# Patient Record
Sex: FEMALE | Race: WHITE | URBAN - METROPOLITAN AREA
[De-identification: names, ages, dates, MRNs, and addresses within clinical notes are randomized per-mention and may not be internally consistent; named-entity substitution may affect disease eponyms.]

---

## 2022-10-10 ENCOUNTER — TELEPHONE (OUTPATIENT)
Dept: GASTROENTEROLOGY | Facility: CLINIC | Age: 72
End: 2022-10-10

## 2022-10-10 NOTE — TELEPHONE ENCOUNTER
LMOM for patient to phone back and schedule an office appt for a colonoscopy consultation with Dr Garcia Course

## 2022-11-21 ENCOUNTER — TELEPHONE (OUTPATIENT)
Dept: OBGYN CLINIC | Facility: OTHER | Age: 72
End: 2022-11-21

## 2022-11-21 NOTE — TELEPHONE ENCOUNTER
Offered appointment patient declined  Where Does it Hurt? Both wrists  Are you considering joint replacement? No   Are you seeking a second opinion? Yes  If yes, who is your doctor?  Tom Villanueva

## 2023-07-16 ENCOUNTER — TELEPHONE (OUTPATIENT)
Dept: OTHER | Facility: OTHER | Age: 73
End: 2023-07-16

## 2023-10-06 ENCOUNTER — TELEPHONE (OUTPATIENT)
Age: 73
End: 2023-10-06

## 2023-10-06 NOTE — TELEPHONE ENCOUNTER
Caller: Patient    Doctor/Office: Gastro     Call regarding :  Missed call     Call was transferred to: [facility]

## 2023-10-06 NOTE — TELEPHONE ENCOUNTER
Lm for ptn letting her know that Noni Sacks feel the ptn should schedule directly with one of our hepatologist. If ptn came to the office Ebonyjarad Rojas would just have to refer to to hepatology and that would be a waste of a visit for the ptn. If ptn calls back she needs to cancel her gastro appt and schedule with hepatology. 19-Jul-2022 13:26 spouse

## 2023-10-16 RX ORDER — MELOXICAM 15 MG/1
15 TABLET ORAL DAILY
COMMUNITY
Start: 2023-08-29

## 2023-10-16 RX ORDER — ASCORBIC ACID 500 MG
500 TABLET ORAL DAILY
COMMUNITY

## 2023-10-16 RX ORDER — PANTOPRAZOLE SODIUM 40 MG/1
40 TABLET, DELAYED RELEASE ORAL DAILY
COMMUNITY

## 2023-10-16 RX ORDER — METHYLPREDNISOLONE 4 MG/1
TABLET ORAL
COMMUNITY
Start: 2023-07-26

## 2023-10-16 RX ORDER — SPIRONOLACTONE 50 MG/1
25 TABLET, FILM COATED ORAL DAILY
COMMUNITY
Start: 2023-06-07 | End: 2024-06-06

## 2023-10-16 RX ORDER — FERROUS SULFATE 325(65) MG
TABLET ORAL
COMMUNITY

## 2023-10-16 RX ORDER — ROSUVASTATIN CALCIUM 10 MG/1
10 TABLET, COATED ORAL DAILY
COMMUNITY
Start: 2023-09-28

## 2023-10-16 RX ORDER — METOPROLOL TARTRATE 50 MG/1
50 TABLET, FILM COATED ORAL DAILY
COMMUNITY
Start: 2023-05-01

## 2023-10-16 RX ORDER — DIPHENHYDRAMINE HYDROCHLORIDE 25 MG/1
5 TABLET ORAL DAILY
COMMUNITY

## 2023-10-16 RX ORDER — LATANOPROST 50 UG/ML
SOLUTION/ DROPS OPHTHALMIC
COMMUNITY
Start: 2023-08-23

## 2023-10-16 RX ORDER — GABAPENTIN 300 MG/1
2 CAPSULE ORAL 2 TIMES DAILY
COMMUNITY
Start: 2023-09-13

## 2023-10-16 RX ORDER — AMLODIPINE BESYLATE 5 MG/1
5 TABLET ORAL DAILY
COMMUNITY
Start: 1983-05-05

## 2023-10-16 RX ORDER — BACLOFEN 10 MG/1
10 TABLET ORAL 3 TIMES DAILY
COMMUNITY

## 2023-10-16 RX ORDER — TRAMADOL HYDROCHLORIDE 50 MG/1
TABLET ORAL
COMMUNITY

## 2023-10-16 RX ORDER — ZOLPIDEM TARTRATE 5 MG/1
5 TABLET ORAL DAILY PRN
COMMUNITY
Start: 2023-10-10 | End: 2023-11-09

## 2023-10-17 ENCOUNTER — OFFICE VISIT (OUTPATIENT)
Dept: GASTROENTEROLOGY | Facility: CLINIC | Age: 73
End: 2023-10-17
Payer: COMMERCIAL

## 2023-10-17 VITALS
SYSTOLIC BLOOD PRESSURE: 124 MMHG | DIASTOLIC BLOOD PRESSURE: 84 MMHG | HEIGHT: 64 IN | HEART RATE: 65 BPM | WEIGHT: 216 LBS | BODY MASS INDEX: 36.88 KG/M2

## 2023-10-17 DIAGNOSIS — B18.1 CHRONIC HEPATITIS B (HCC): Primary | ICD-10-CM

## 2023-10-17 DIAGNOSIS — R93.2 ABNORMAL FINDING ON IMAGING OF LIVER: ICD-10-CM

## 2023-10-17 PROCEDURE — 99203 OFFICE O/P NEW LOW 30 MIN: CPT | Performed by: FAMILY MEDICINE

## 2023-10-17 NOTE — PROGRESS NOTES
[facility] - Outpatient Consultation  Moultrie Sandy 71 y.o. female MRN: [id number]  Encounter: [id number]          ASSESSMENT AND PLAN:      1. Chronic hepatitis B ([address])  2. Abnormal finding on imaging of liver  Patient with chronic HBV, presumably contracted through vertical transmission, historically noted to have a low viral load and normal transaminases. She does report an HBV "flare" characterized as acute jaundice in her 45s but has never been treated with medication for HBV. Prior abdominal imaging notable for coarsened hepatic echotexture, but otherwise without clinical or serologic evidence of chronic liver disease. Discussed the basic pathophysiology of HBV and general treatment recommendations for chronic HBV as per AASLD guidelines including treatment for immune-active individuals with ALT >2x ULN and viral load >2000 (eAg-) or >20,000 (eAg+) in the absence of advanced fibrosis. Ordered complete HBV serologies. Also ordered serologies to r/o a coinfection with HIV and/or HCV and assess patient's immunity status to hep A. Patient gave verbal consent for HIV testing. If patient meets criteria for treatment, discussed treatment with Viread vs Vemlidy (TAF) and their side-effect profile. She is aware this medication may be required indefinitely and the importance of medication compliance. If patient does not meet criteria for treatment, recommend checking serologies including a CMP and HBV viral load at least q6 months. Given she has reportedly had chronic HBV for approx 50 years, would also recommend a RUQ US and AFP level q6 months for hepatoma screening - Abdominal U/S and AFP levels ordered today. There is some concern for advanced hepatic fibrosis given coarsened hepatic echotexture noted on prior imaging.  Fortunately, she has no serologic or radiographic evidence of sg lHTN and no clinical evidence of chronic liver disease on physical exam. She will complete an U/S elastography for further evaluation.     - CBC and differential; Future  - Comprehensive metabolic panel; Future  - Protime-INR; Future  - AFP tumor marker; Future  - Chronic Hepatitis Panel; Future  - Hepatitis A antibody, total; Future  - Hepatitis B surface antibody; Future  - Hepatitis B E Antibody [facility]; Future  - Hepatitis B E Antigen [facility]; Future  - Hepatitis B DNA, ultraquantitative, PCR; Future  - HIV 1/2 AG/AB w Reflex [facility] for 2 yr old and above; Future  - US abdomen complete; Future  - US elastography/UGAP; Future  - Hepatitis delta virus; Future    Follow-up in 6 months or sooner if necessary. ______________________________________________________________________    HPI: Patient is a 71 y.o. female with PMH significant for HTN, HLD, psoriasis/psoriatic arthritis on methotrexate, gastric sleeve and lumbar spondylosis who presents today for consultation regarding chronic hepatitis B. Patient is new to [facility] hepatology but was previously following with an outside GI/hepatology group for management of chronic hepatitis B. Per chart review, her most recent serologies include a low HBV viral load (16) in August 2023 and normal hepatic function in October 2023. It appears patient's viral load has been routinely low (<2000) and hepatic function within normal limits dating back to 2021. Most recent abdominal imaging includes an ultrasound from July 2021 notable for coarsened hepatic echotexture and mildly dilated CBD (8mm) s/p cholecystectomy. Serologies without thrombocytopenia or hypoalbuminemia. In regards to kevin HBV, patient believes she may have gotten this from her mother. States that her father was in the service and may have transmitted HBV to her mother seeing as both her and her brother were diagnosed with hepatitis B. Her brother cleared his infection.   Denies a history of intravenous or intranasal drug use, prior blood transfusions or recreationally performed tattoos. Patient was previously following with a Dr. Rosalie Lee in [facility] but has not been seen within the last 3 years. States she had either a FibroScan or US elastography for evaluation of fibrosis which was reportedly unremarkable. States that her infection has remained "inactive" but that she did have an episode of acute jaundice in her 45s. Denies prior treatment for hepatitis B. Denies family history of liver related cancers. REVIEW OF SYSTEMS:    CONSTITUTIONAL: Denies any fever, chills, rigors, and weight loss. HEENT: No earache or tinnitus. Denies hearing loss or visual disturbances. CARDIOVASCULAR: No chest pain or palpitations. RESPIRATORY: Denies any cough, hemoptysis, shortness of breath or dyspnea on exertion. GASTROINTESTINAL: As noted in the History of Present Illness. GENITOURINARY: No problems with urination. Denies any hematuria or dysuria. NEUROLOGIC: No dizziness or vertigo, denies headaches. MUSCULOSKELETAL: Denies any muscle or joint pain. SKIN: Denies skin rashes or itching. ENDOCRINE: Denies excessive thirst. Denies intolerance to heat or cold. PSYCHOSOCIAL: Denies depression or anxiety. Denies any recent memory loss. Historical Information   History reviewed. No pertinent past medical history. History reviewed. No pertinent surgical history.   Social History   Social History     Substance and Sexual Activity   Alcohol Use Not Currently     Social History     Substance and Sexual Activity   Drug Use Never     Social History     Tobacco Use   Smoking Status Never   Smokeless Tobacco Never     Family History   Problem Relation Age of Onset   • No Known Problems Mother    • No Known Problems Father    • No Known Problems Sister    • No Known Problems Brother    • No Known Problems Maternal Grandmother    • No Known Problems Maternal Grandfather    • No Known Problems Paternal Grandmother    • No Known Problems Paternal Grandfather    • No Known Problems Maternal Aunt    • No Known Problems Maternal Uncle    • No Known Problems Paternal Aunt    • No Known Problems Paternal Uncle    • No Known Problems Cousin        Meds/Allergies       Current Outpatient Medications:   •  amLODIPine (NORVASC) 5 mg tablet  •  Apoaequorin (Prevagen) 10 MG CAPS  •  ascorbic acid (VITAMIN C) 500 MG tablet  •  baclofen 10 mg tablet  •  Biotin 5 MG CAPS  •  Calcium Carbonate-Vitamin D 600-3. 125 MG-MCG TABS  •  Cholecalciferol 25 MCG (1000 UT) capsule  •  ferrous sulfate 325 (65 Fe) mg tablet  •  gabapentin (NEURONTIN) 300 mg capsule  •  latanoprost (XALATAN) 0.005 % ophthalmic solution  •  meloxicam (MOBIC) 15 mg tablet  •  methotrexate 2.5 mg tablet  •  methylPREDNISolone 4 MG tablet therapy pack  •  metoprolol tartrate (LOPRESSOR) 50 mg tablet  •  pantoprazole (PROTONIX) 40 mg tablet  •  rosuvastatin (CRESTOR) 10 MG tablet  •  sertraline (ZOLOFT) 50 mg tablet  •  spironolactone (ALDACTONE) 50 mg tablet  •  traMADol (ULTRAM) 50 mg tablet  •  zolpidem (AMBIEN) 5 mg tablet    Allergies   Allergen Reactions   • Aspirin GI Intolerance   • Codeine Abdominal Pain, GI Intolerance and Other (See Comments)   • Oxycodone-Acetaminophen GI Intolerance and Other (See Comments)     No Tylenol allergy   No Tylenol allergy    No Tylenol allergy           Objective     Blood pressure 124/84, pulse 65, height 5' 4" (1.626 m), weight 98 kg (216 lb). Body mass index is 37.08 kg/m². PHYSICAL EXAM:      General Appearance:   Alert, cooperative, no distress   HEENT:   Normocephalic, atraumatic, anicteric. Neck:  Supple, symmetrical, trachea midline   Lungs:   Clear to auscultation bilaterally; no rales, rhonchi or wheezing; respirations unlabored    Heart[de-identified]   Regular rate and rhythm; no murmur, rub, or gallop.    Abdomen:   Soft, non-tender, non-distended; normal bowel sounds; no masses, no organomegaly    Genitalia:   Deferred    Rectal:   Deferred    Extremities:  No cyanosis, clubbing or edema Pulses:  2+ and symmetric    Skin:  No jaundice, rashes, or lesions    Lymph nodes:  No palpable cervical lymphadenopathy        Lab Results:   No visits with results within 1 Day(s) from this visit. Latest known visit with results is:   No results found for any previous visit. Radiology Results:   No results found. Jason Nugent PA-C     **Please note: Dictation voice to text software may have been used in the creation of this record. Occasional wrong word or “sound alike” substitutions may have occurred due to the inherent limitations of voice recognition software. Read the chart carefully and recognize, using context, where substitutions have occurred. **

## 2023-10-23 ENCOUNTER — TELEPHONE (OUTPATIENT)
Age: 73
End: 2023-10-23

## 2023-10-23 LAB
EXTERNAL HIV SCREEN: NORMAL
HCV AB SER-ACNC: NORMAL

## 2023-10-23 NOTE — TELEPHONE ENCOUNTER
Patients GI provider:  Shellie Thapa    Number to return call: [phone]    Reason for call: Simi Flores from [facility] calling to verify the [facility] lab order as pt is at the lab now for the blood draw. Asking for a call back as soon as possible with clarification.      Scheduled procedure/appointment date if applicable: 5/9/9052 - Dr. Concepcion Kruse - Hepatology F/U

## 2023-10-23 NOTE — TELEPHONE ENCOUNTER
Spoke to Carlene, [facility] lab -clarified hepatitis delta virus testing ordered.

## 2023-11-02 ENCOUNTER — TELEPHONE (OUTPATIENT)
Age: 73
End: 2023-11-02

## 2023-11-02 NOTE — TELEPHONE ENCOUNTER
Patients GI provider:  Dr. rivera    Number to return call: ( [phone]    Reason for call: Pt calling to reschedule her US ELASTOGRAPHY/UGAP. Please advise.     Scheduled procedure/appointment date if applicable: Apt/procedure

## 2023-11-02 NOTE — TELEPHONE ENCOUNTER
Spoke with the patient and advised her she would need to contact central scheduling to reschedule this procedure. She was given the phone number.

## 2023-11-04 DIAGNOSIS — B18.1 CHRONIC HEPATITIS B (HCC): Primary | ICD-10-CM

## 2023-12-05 ENCOUNTER — HOSPITAL ENCOUNTER (OUTPATIENT)
Dept: ULTRASOUND IMAGING | Facility: HOSPITAL | Age: 73
Discharge: HOME | End: 2023-12-05
Payer: COMMERCIAL

## 2023-12-05 DIAGNOSIS — B18.1 CHRONIC HEPATITIS B (HCC): ICD-10-CM

## 2023-12-05 PROCEDURE — 76700 US EXAM ABDOM COMPLETE: CPT

## 2023-12-05 PROCEDURE — 76981 USE PARENCHYMA: CPT

## 2024-04-04 ENCOUNTER — TELEPHONE (OUTPATIENT)
Dept: GASTROENTEROLOGY | Facility: CLINIC | Age: 74
End: 2024-04-04

## 2024-04-04 NOTE — TELEPHONE ENCOUNTER
Spoke with patient, asking her to have labs drawn that tyra Cook ordered on 11/4/23, before her appointment with Dr. Resendiz on 4/8/24. Patient states she will go to [facility] tomorrow to have labs drawn.

## 2024-04-05 ENCOUNTER — APPOINTMENT (OUTPATIENT)
Dept: LAB | Facility: HOSPITAL | Age: 74
End: 2024-04-05
Payer: COMMERCIAL

## 2024-04-05 DIAGNOSIS — B18.1 CHRONIC HEPATITIS B (HCC): ICD-10-CM

## 2024-04-05 LAB
ALBUMIN SERPL BCP-MCNC: 4.3 G/DL (ref 3.5–5)
ALP SERPL-CCNC: 55 U/L (ref 34–104)
ALT SERPL W P-5'-P-CCNC: 8 U/L (ref 7–52)
ANION GAP SERPL CALCULATED.3IONS-SCNC: 6 MMOL/L (ref 4–13)
AST SERPL W P-5'-P-CCNC: 14 U/L (ref 13–39)
BILIRUB SERPL-MCNC: 0.43 MG/DL (ref 0.2–1)
BUN SERPL-MCNC: 16 MG/DL (ref 5–25)
CALCIUM SERPL-MCNC: 9.6 MG/DL (ref 8.4–10.2)
CHLORIDE SERPL-SCNC: 103 MMOL/L (ref 96–108)
CO2 SERPL-SCNC: 29 MMOL/L (ref 21–32)
CREAT SERPL-MCNC: 0.75 MG/DL (ref 0.6–1.3)
GFR SERPL CREATININE-BSD FRML MDRD: 79 ML/MIN/1.73SQ M
GLUCOSE P FAST SERPL-MCNC: 100 MG/DL (ref 65–99)
POTASSIUM SERPL-SCNC: 4.2 MMOL/L (ref 3.5–5.3)
PROT SERPL-MCNC: 7.9 G/DL (ref 6.4–8.4)
SODIUM SERPL-SCNC: 138 MMOL/L (ref 135–147)

## 2024-04-05 PROCEDURE — 36415 COLL VENOUS BLD VENIPUNCTURE: CPT

## 2024-04-05 PROCEDURE — 80053 COMPREHEN METABOLIC PANEL: CPT

## 2024-04-05 PROCEDURE — 87517 HEPATITIS B DNA QUANT: CPT

## 2024-04-08 LAB — HBV DNA SERPL NAA+PROBE-ACNC: ABNORMAL [IU]/ML
